# Patient Record
Sex: FEMALE | Race: WHITE | Employment: OTHER | URBAN - METROPOLITAN AREA
[De-identification: names, ages, dates, MRNs, and addresses within clinical notes are randomized per-mention and may not be internally consistent; named-entity substitution may affect disease eponyms.]

---

## 2019-05-16 ENCOUNTER — APPOINTMENT (OUTPATIENT)
Dept: MRI IMAGING | Age: 74
End: 2019-05-16
Attending: EMERGENCY MEDICINE
Payer: MEDICARE

## 2019-05-16 ENCOUNTER — HOSPITAL ENCOUNTER (EMERGENCY)
Age: 74
Discharge: HOME | End: 2019-05-16
Attending: EMERGENCY MEDICINE
Payer: MEDICARE

## 2019-05-16 ENCOUNTER — APPOINTMENT (OUTPATIENT)
Dept: CT IMAGING | Age: 74
End: 2019-05-16
Attending: EMERGENCY MEDICINE
Payer: MEDICARE

## 2019-05-16 VITALS
DIASTOLIC BLOOD PRESSURE: 40 MMHG | RESPIRATION RATE: 15 BRPM | WEIGHT: 186 LBS | SYSTOLIC BLOOD PRESSURE: 105 MMHG | HEART RATE: 60 BPM | TEMPERATURE: 97.4 F | OXYGEN SATURATION: 95 % | BODY MASS INDEX: 29.13 KG/M2

## 2019-05-16 DIAGNOSIS — R42 VERTIGO: Primary | ICD-10-CM

## 2019-05-16 DIAGNOSIS — R11.2 NAUSEA AND VOMITING, INTRACTABILITY OF VOMITING NOT SPECIFIED, UNSPECIFIED VOMITING TYPE: ICD-10-CM

## 2019-05-16 LAB
ANION GAP SERPL CALC-SCNC: 6 MMOL/L (ref 5–15)
ATRIAL RATE: 60 BPM
BASOPHILS # BLD: 0.1 K/UL (ref 0–0.1)
BASOPHILS NFR BLD: 1 % (ref 0–1)
BUN SERPL-MCNC: 12 MG/DL (ref 6–20)
BUN/CREAT SERPL: 17 (ref 12–20)
CALCIUM SERPL-MCNC: 8.5 MG/DL (ref 8.5–10.1)
CALCULATED P AXIS, ECG09: 35 DEGREES
CALCULATED R AXIS, ECG10: 61 DEGREES
CALCULATED T AXIS, ECG11: 88 DEGREES
CHLORIDE SERPL-SCNC: 111 MMOL/L (ref 97–108)
CO2 SERPL-SCNC: 26 MMOL/L (ref 21–32)
CREAT SERPL-MCNC: 0.69 MG/DL (ref 0.55–1.02)
DIAGNOSIS, 93000: NORMAL
DIFFERENTIAL METHOD BLD: ABNORMAL
EOSINOPHIL # BLD: 0.1 K/UL (ref 0–0.4)
EOSINOPHIL NFR BLD: 1 % (ref 0–7)
ERYTHROCYTE [DISTWIDTH] IN BLOOD BY AUTOMATED COUNT: 12.4 % (ref 11.5–14.5)
GLUCOSE BLD STRIP.AUTO-MCNC: 113 MG/DL (ref 65–100)
GLUCOSE BLD STRIP.AUTO-MCNC: 153 MG/DL (ref 65–100)
GLUCOSE SERPL-MCNC: 153 MG/DL (ref 65–100)
HCT VFR BLD AUTO: 36.6 % (ref 35–47)
HGB BLD-MCNC: 12 G/DL (ref 11.5–16)
IMM GRANULOCYTES # BLD AUTO: 0 K/UL (ref 0–0.04)
IMM GRANULOCYTES NFR BLD AUTO: 0 % (ref 0–0.5)
INR PPP: 1.1 (ref 0.9–1.1)
LYMPHOCYTES # BLD: 1.4 K/UL (ref 0.8–3.5)
LYMPHOCYTES NFR BLD: 16 % (ref 12–49)
MCH RBC QN AUTO: 29.3 PG (ref 26–34)
MCHC RBC AUTO-ENTMCNC: 32.8 G/DL (ref 30–36.5)
MCV RBC AUTO: 89.3 FL (ref 80–99)
MONOCYTES # BLD: 0.4 K/UL (ref 0–1)
MONOCYTES NFR BLD: 4 % (ref 5–13)
NEUTS SEG # BLD: 6.6 K/UL (ref 1.8–8)
NEUTS SEG NFR BLD: 78 % (ref 32–75)
NRBC # BLD: 0 K/UL (ref 0–0.01)
NRBC BLD-RTO: 0 PER 100 WBC
P-R INTERVAL, ECG05: 162 MS
PLATELET # BLD AUTO: 216 K/UL (ref 150–400)
PMV BLD AUTO: 9.6 FL (ref 8.9–12.9)
POTASSIUM SERPL-SCNC: 3.7 MMOL/L (ref 3.5–5.1)
PROTHROMBIN TIME: 11.1 SEC (ref 9–11.1)
Q-T INTERVAL, ECG07: 450 MS
QRS DURATION, ECG06: 84 MS
QTC CALCULATION (BEZET), ECG08: 450 MS
RBC # BLD AUTO: 4.1 M/UL (ref 3.8–5.2)
SERVICE CMNT-IMP: ABNORMAL
SERVICE CMNT-IMP: ABNORMAL
SODIUM SERPL-SCNC: 143 MMOL/L (ref 136–145)
TROPONIN I SERPL-MCNC: <0.05 NG/ML
VENTRICULAR RATE, ECG03: 60 BPM
WBC # BLD AUTO: 8.5 K/UL (ref 3.6–11)

## 2019-05-16 PROCEDURE — 85025 COMPLETE CBC W/AUTO DIFF WBC: CPT

## 2019-05-16 PROCEDURE — 82962 GLUCOSE BLOOD TEST: CPT

## 2019-05-16 PROCEDURE — 0042T CT PERF W CBF: CPT

## 2019-05-16 PROCEDURE — 96374 THER/PROPH/DIAG INJ IV PUSH: CPT

## 2019-05-16 PROCEDURE — 74011250637 HC RX REV CODE- 250/637: Performed by: EMERGENCY MEDICINE

## 2019-05-16 PROCEDURE — 74011636320 HC RX REV CODE- 636/320: Performed by: EMERGENCY MEDICINE

## 2019-05-16 PROCEDURE — 99285 EMERGENCY DEPT VISIT HI MDM: CPT

## 2019-05-16 PROCEDURE — 70551 MRI BRAIN STEM W/O DYE: CPT

## 2019-05-16 PROCEDURE — 70450 CT HEAD/BRAIN W/O DYE: CPT

## 2019-05-16 PROCEDURE — 74011250636 HC RX REV CODE- 250/636: Performed by: EMERGENCY MEDICINE

## 2019-05-16 PROCEDURE — 84484 ASSAY OF TROPONIN QUANT: CPT

## 2019-05-16 PROCEDURE — 70496 CT ANGIOGRAPHY HEAD: CPT

## 2019-05-16 PROCEDURE — 85610 PROTHROMBIN TIME: CPT

## 2019-05-16 PROCEDURE — 36415 COLL VENOUS BLD VENIPUNCTURE: CPT

## 2019-05-16 PROCEDURE — 80048 BASIC METABOLIC PNL TOTAL CA: CPT

## 2019-05-16 PROCEDURE — 93005 ELECTROCARDIOGRAM TRACING: CPT

## 2019-05-16 RX ORDER — MECLIZINE HYDROCHLORIDE 25 MG/1
25 TABLET ORAL
Qty: 20 TAB | Refills: 0 | Status: SHIPPED | OUTPATIENT
Start: 2019-05-16

## 2019-05-16 RX ORDER — CARBOXYMETHYLCELLULOSE SODIUM 10 MG/ML
1 GEL OPHTHALMIC
COMMUNITY

## 2019-05-16 RX ORDER — FISH OIL/DHA/EPA 1200-144MG
1200 CAPSULE ORAL 2 TIMES DAILY
COMMUNITY

## 2019-05-16 RX ORDER — METOPROLOL SUCCINATE 25 MG/1
50 TABLET, EXTENDED RELEASE ORAL DAILY
COMMUNITY

## 2019-05-16 RX ORDER — ONDANSETRON 4 MG/1
4 TABLET, ORALLY DISINTEGRATING ORAL
Qty: 10 TAB | Refills: 0 | Status: SHIPPED | OUTPATIENT
Start: 2019-05-16

## 2019-05-16 RX ORDER — SODIUM CHLORIDE 0.9 % (FLUSH) 0.9 %
10 SYRINGE (ML) INJECTION
Status: COMPLETED | OUTPATIENT
Start: 2019-05-16 | End: 2019-05-16

## 2019-05-16 RX ORDER — ONDANSETRON 2 MG/ML
4 INJECTION INTRAMUSCULAR; INTRAVENOUS
Status: COMPLETED | OUTPATIENT
Start: 2019-05-16 | End: 2019-05-16

## 2019-05-16 RX ORDER — ONDANSETRON 4 MG/1
4 TABLET, ORALLY DISINTEGRATING ORAL
Status: COMPLETED | OUTPATIENT
Start: 2019-05-16 | End: 2019-05-16

## 2019-05-16 RX ADMIN — ONDANSETRON 4 MG: 4 TABLET, ORALLY DISINTEGRATING ORAL at 15:31

## 2019-05-16 RX ADMIN — Medication 10 ML: at 09:37

## 2019-05-16 RX ADMIN — IOPAMIDOL 110 ML: 755 INJECTION, SOLUTION INTRAVENOUS at 09:36

## 2019-05-16 RX ADMIN — ONDANSETRON 4 MG: 2 INJECTION INTRAMUSCULAR; INTRAVENOUS at 10:18

## 2019-05-16 NOTE — DISCHARGE INSTRUCTIONS
Patient Education        Dizziness: Care Instructions  Your Care Instructions  Dizziness is the feeling of unsteadiness or fuzziness in your head. It is different than having vertigo, which is a feeling that the room is spinning or that you are moving or falling. It is also different from lightheadedness, which is the feeling that you are about to faint. It can be hard to know what causes dizziness. Some people feel dizzy when they have migraine headaches. Sometimes bouts of flu can make you feel dizzy. Some medical conditions, such as heart problems or high blood pressure, can make you feel dizzy. Many medicines can cause dizziness, including medicines for high blood pressure, pain, or anxiety. If a medicine causes your symptoms, your doctor may recommend that you stop or change the medicine. If it is a problem with your heart, you may need medicine to help your heart work better. If there is no clear reason for your symptoms, your doctor may suggest watching and waiting for a while to see if the dizziness goes away on its own. Follow-up care is a key part of your treatment and safety. Be sure to make and go to all appointments, and call your doctor if you are having problems. It's also a good idea to know your test results and keep a list of the medicines you take. How can you care for yourself at home? · If your doctor recommends or prescribes medicine, take it exactly as directed. Call your doctor if you think you are having a problem with your medicine. · Do not drive while you feel dizzy. · Try to prevent falls. Steps you can take include:  ? Using nonskid mats, adding grab bars near the tub, and using night-lights. ? Clearing your home so that walkways are free of anything you might trip on.  ? Letting family and friends know that you have been feeling dizzy. This will help them know how to help you. When should you call for help? Call 911 anytime you think you may need emergency care.  For example, call if:    · You passed out (lost consciousness).     · You have dizziness along with symptoms of a heart attack. These may include:  ? Chest pain or pressure, or a strange feeling in the chest.  ? Sweating. ? Shortness of breath. ? Nausea or vomiting. ? Pain, pressure, or a strange feeling in the back, neck, jaw, or upper belly or in one or both shoulders or arms. ? Lightheadedness or sudden weakness. ? A fast or irregular heartbeat.     · You have symptoms of a stroke. These may include:  ? Sudden numbness, tingling, weakness, or loss of movement in your face, arm, or leg, especially on only one side of your body. ? Sudden vision changes. ? Sudden trouble speaking. ? Sudden confusion or trouble understanding simple statements. ? Sudden problems with walking or balance. ? A sudden, severe headache that is different from past headaches.    Call your doctor now or seek immediate medical care if:    · You feel dizzy and have a fever, headache, or ringing in your ears.     · You have new or increased nausea and vomiting.     · Your dizziness does not go away or comes back.    Watch closely for changes in your health, and be sure to contact your doctor if:    · You do not get better as expected. Where can you learn more? Go to http://oziel-ling.info/. Enter I128 in the search box to learn more about \"Dizziness: Care Instructions. \"  Current as of: September 23, 2018  Content Version: 11.9  © 6129-0076 [employer]. Care instructions adapted under license by [employer] (which disclaims liability or warranty for this information). If you have questions about a medical condition or this instruction, always ask your healthcare professional. [place] [address] any warranty or liability for your use of this information.          Patient Education        Vertigo: Care Instructions  Your Care Instructions    Vertigo is the feeling that you or your surroundings are moving when there is no actual movement. It is often described as a feeling of spinning, whirling, falling, or tilting. Vertigo may make you vomit or feel nauseated. You may have trouble standing or walking and may lose your balance. Vertigo is often related to an inner ear problem, but it can have other more serious causes. If vertigo continues, you may need more tests to find its cause. Follow-up care is a key part of your treatment and safety. Be sure to make and go to all appointments, and call your doctor if you are having problems. It's also a good idea to know your test results and keep a list of the medicines you take. How can you care for yourself at home? · Do not lie flat on your back. Prop yourself up slightly. This may reduce the spinning feeling. Keep your eyes open. · Move slowly so that you do not fall. · If your doctor recommends medicine, take it exactly as directed. · Do not drive while you are having vertigo. Certain exercises, called Espinoza-Daroff exercises, can help decrease vertigo. To do Espinoza-Daroff exercises:  · Sit on the edge of a bed or sofa and quickly lie down on the side that causes the worst vertigo. Lie on your side with your ear down. · Stay in this position for at least 30 seconds or until the vertigo goes away. · Sit up. If this causes vertigo, wait for it to stop. · Repeat the procedure on the other side. · Repeat this 10 times. Do these exercises 2 times a day until the vertigo is gone. When should you call for help? Call 911 anytime you think you may need emergency care. For example, call if:    · You passed out (lost consciousness).     · You have symptoms of a stroke. These may include:  ? Sudden numbness, tingling, weakness, or loss of movement in your face, arm, or leg, especially on only one side of your body. ? Sudden vision changes. ? Sudden trouble speaking. ? Sudden confusion or trouble understanding simple statements.   ? Sudden problems with walking or balance. ? A sudden, severe headache that is different from past headaches.    Call your doctor now or seek immediate medical care if:    · Vertigo occurs with a fever, a headache, or ringing in your ears.     · You have new or increased nausea and vomiting.    Watch closely for changes in your health, and be sure to contact your doctor if:    · Vertigo gets worse or happens more often.     · Vertigo has not gotten better after 2 weeks. Where can you learn more? Go to http://oziel-ling.info/. Enter B803 in the search box to learn more about \"Vertigo: Care Instructions. \"  Current as of: March 27, 2018  Content Version: 11.9  © 8136-8567 [employer]. Care instructions adapted under license by [employer] (which disclaims liability or warranty for this information). If you have questions about a medical condition or this instruction, always ask your healthcare professional. [place] [address] any warranty or liability for your use of this information.

## 2019-05-16 NOTE — ED NOTES
Assumed care of pt at this time. Made contact with pt in bedside report. MRI screening completed and faxed at this time

## 2019-05-16 NOTE — ED NOTES
Pt tolerated ginger ale and crackers well without N/V. Pt ambulated at this time. Pt denies dizziness, gait steady however moves slightly to the right. Pt states she feels better and wants to go home. MD casey

## 2019-05-16 NOTE — ED NOTES
Pt reports her dizziness is improving. Skin warm and dry. Respirations even and unlabored. In no apparent distress at this time.

## 2019-05-16 NOTE — PROGRESS NOTES
Pharmacy Clarification of Prior to Admission Medication Regimen The patient was interviewed regarding clarification of the prior to admission medication regimen. Daughters were present in room and obtained permission from patient to discuss drug regimen with visitor(s) present. Patient was questioned regarding use of any other inhalers, topical products, over the counter medications, herbal medications, vitamin products or ophthalmic/nasal/otic medication use. Information Obtained From: Patient, prescription bottles, RX Query Pertinent Pharmacy Findings: 
Updated patient?s preferred outpatient pharmacy to: [address], [address] [facility] medication list was corrected to the following:  
 
Prior to Admission Medications Prescriptions Last Dose Informant Patient Reported? Taking? FLUoxetine (PROZAC) 40 mg capsule 5/15/2019 at Unknown time Other Yes Yes Sig: Take 40 mg by mouth daily. carboxymethylcellulose sodium (REFRESH LIQUIGEL) 1 % dlgl ophthalmic solution 5/9/2019 at Unknown time Self Yes Yes Sig: Administer 1 Drop to both eyes daily as needed (dry eyes). clopidogrel (PLAVIX) 75 mg tablet 5/15/2019 at Unknown time Other No Yes Sig: Take 1 tablet by mouth daily. fish oil-dha-epa 1,200-144-216 mg cap 5/15/2019 at Unknown time Other Yes Yes Sig: Take 1,200 mg by mouth two (2) times a day. metoprolol succinate (TOPROL-XL) 25 mg XL tablet 5/15/2019 at Unknown time Other Yes Yes Sig: Take 50 mg by mouth daily. simvastatin (ZOCOR) 40 mg tablet 5/15/2019 at Unknown time Other Yes Yes Sig: Take 40 mg by mouth nightly. Facility-Administered Medications: None Thank you, 
Cholo Webster CPhT Medication History Pharmacy Technician

## 2019-05-16 NOTE — ED NOTES
Attempted to walk pt at this time. Pt became very dizzy, diaphoretic, and nauseous. MD aware verbal orders for POC glucose, snack, and zofran.  at this time. MD aware

## 2019-05-16 NOTE — ED NOTES
MD acessed pt at this time. Does not feel that we need to flush ear at this time. MD verbal orders to walk pt

## 2019-05-16 NOTE — ED NOTES
Bedside and Verbal shift change report given to Mohit Zuniga RN (oncoming nurse) by this RN (offgoing nurse). Report included the following information SBAR.

## 2019-05-16 NOTE — ED NOTES
Per pt's daughters, Christine Herrera went to the ear doctor recently and they told her that her ear was clogged, but they didn't clean it out. \"

## 2019-05-16 NOTE — ROUTINE PROCESS
MRI NOTE 
 
MRI screening sheet needs to be completed and signed before the MRI can be performed. Please call [phone]  When this is done

## 2019-05-16 NOTE — ED NOTES
Elena Rosenbaum MD reviewed discharge instructions with the patient. The patient verbalized understanding. All questions and concerns were addressed. The patient was discharged via wheelchair in the care of family members with instructions and prescriptions in hand. Pt is alert and oriented x 4. Respirations are clear and unlabored.

## 2019-05-22 NOTE — ED PROVIDER NOTES
EMERGENCY DEPARTMENT HISTORY AND PHYSICAL EXAM      Date: 5/16/2019  Patient Name: Jenny Mcknight    History of Presenting Illness     Chief Complaint   Patient presents with    Dizziness     Presents to the ED via EMS with c/o waking up with dizziness and n/v. Reports history of vertigo and states she thinks this current episode is vertigo related.  Vomiting       History Provided By: Patient    HPI: Jenny Mcnkight, 68 y.o. female with PMHx as noted below presents the emergency department chief complaint of vertigo, nausea and vomiting. Patient states she woke up at approximately 730 this morning and went to stand up and felt as though the room was spinning. This was associated with nausea and several episodes of nonbloody emesis. Patient states her symptoms seem to be worse with movement and sudden head rotation and are somewhat relieved at rest.  Patient states she has had vertigo in the past but this feels more severe. Otherwise denies any tenderness, extremity weakness/numbness, chest pain, shortness of breath, headache, blurred/double vision, fevers, chills, dysuria      PCP: Danny Maria MD    Current Outpatient Medications   Medication Sig Dispense Refill    metoprolol succinate (TOPROL-XL) 25 mg XL tablet Take 50 mg by mouth daily.  fish oil-dha-epa 1,200-144-216 mg cap Take 1,200 mg by mouth two (2) times a day.  carboxymethylcellulose sodium (REFRESH LIQUIGEL) 1 % dlgl ophthalmic solution Administer 1 Drop to both eyes daily as needed (dry eyes).  meclizine (ANTIVERT) 25 mg tablet Take 1 Tab by mouth three (3) times daily as needed for Dizziness. 20 Tab 0    ondansetron (ZOFRAN ODT) 4 mg disintegrating tablet Take 1 Tab by mouth every eight (8) hours as needed for Nausea. 10 Tab 0    clopidogrel (PLAVIX) 75 mg tablet Take 1 tablet by mouth daily. 90 tablet 3    simvastatin (ZOCOR) 40 mg tablet Take 40 mg by mouth nightly.       FLUoxetine (PROZAC) 40 mg capsule Take 40 mg by mouth daily. Past History     Past Medical History:  Past Medical History:   Diagnosis Date    High cholesterol     Hypertension     Psychiatric disorder     depression       Past Surgical History:  Past Surgical History:   Procedure Laterality Date    CARDIAC SURG PROCEDURE UNLIST      stent    HX ORTHOPAEDIC      back       Family History:  Family History   Problem Relation Age of Onset    Stroke Mother        Social History:  Social History     Tobacco Use    Smoking status: Never Smoker   Substance Use Topics    Alcohol use: No    Drug use: No       Allergies: Allergies   Allergen Reactions    Prinzide [Lisinopril-Hydrochlorothiazide] Swelling         Review of Systems   Review of Systems  Constitutional: Negative for fever, chills, and fatigue. HENT: Negative for congestion, sore throat, rhinorrhea, sneezing and neck stiffness   Eyes: Negative for discharge and redness. Respiratory: Negative for  shortness of breath, wheezing   Cardiovascular: Negative for chest pain, palpitations   Gastrointestinal: Negative for nausea, vomiting, abdominal pain, constipation, diarrhea and blood in stool. Genitourinary: Negative for dysuria, hematuria, flank pain, decreased urine volume, discharge,   Musculoskeletal: Negative for myalgias or joint pain . Skin: Negative for rash or lesions . Neurological: Negative weakness,numbness and headaches. Positive vertigo      Physical Exam   Physical Exam    GENERAL: alert and oriented, no acute distress  EYES: PEERL, No injection, discharge or icterus. ENT: Mucous membranes pink and moist.  NECK: Supple  LUNGS: Airway patent. Non-labored respirations. Breath sounds clear with good air entry bilaterally. HEART: Regular rate and rhythm. No peripheral edema  ABDOMEN: Non-distended and non-tender, without guarding or rebound.   SKIN:  warm, dry  MSK/EXTREMITIES: Without swelling, tenderness or deformity, symmetric with normal ROM  NEUROLOGICAL:  alert and oriented x 3, CN II-XII grossly intact, strength 5/5 bilateral upper and lower extremities, sensation intact throughout to light touch, no dysmetria. Diagnostic Study Results     Labs -   No results found for this or any previous visit (from the past 12 hour(s)). Radiologic Studies -   MRI BRAIN WO CONT   Final Result   IMPRESSION:   No acute process. CTA CODE NEURO HEAD AND NECK W CONT   Final Result   IMPRESSION:   1. No evidence of large vessel occlusion, hemodynamically significant stenosis,   or intracranial aneurysm. 2.  Normal CT perfusion. CT PERF W CBF   Final Result   IMPRESSION:   1. No evidence of large vessel occlusion, hemodynamically significant stenosis,   or intracranial aneurysm. 2.  Normal CT perfusion. CT CODE NEURO HEAD WO CONTRAST   Final Result   IMPRESSION: No acute process or change compared to the prior exam.              CT Results  (Last 48 hours)    None        CXR Results  (Last 48 hours)    None            Medical Decision Making   I am the first provider for this patient. I reviewed the vital signs, available nursing notes, past medical history, past surgical history, family history and social history. Vital Signs-Reviewed the patient's vital signs. EKG interpretation: (Preliminary)  Rhythm: normal sinus rhythm; and regular . Rate (approx.): 60; Axis: normal; P wave: normal; QRS interval: normal ; ST/T wave: normal;  was interpreted by Fortunato Mills MD,ED Provider. Records Reviewed: Nursing Notes and Old Medical Records    Provider Notes (Medical Decision Making): On presentation, the patient is well appearing, in no acute distress with normal vital signs. Based on my history and exam the differential diagnosis for this patient includes stroke, BPPV, Ménière's disease, labyrinthitis. Consult with tele-neurology felt this was likely peripheral cause of the vertigo.   CT CT angiogram were obtained on arrival which showed no acute pathology. MRI also showed no acute pathology. Patient's symptoms were treated and on reevaluation was feeling much better. She was able to ambulate unassisted prior to discharge. ED Course:   Initial assessment performed. The patients presenting problems have been discussed, and they are in agreement with the care plan formulated and outlined with them. I have encouraged them to ask questions as they arise throughout their visit. PROGRESS NOTE:  The patient has been re-evaluated and is ready for discharge. Reviewed available results with patient and have counseled them on diagnosis and care plan. They have expressed understanding, and all their questions have been answered. They agree with plan and agree to have pt F/U as recommended, or return to the ED if their sxs worsen. Discharge instructions have been provided and explained to them, along with reasons to have pt return to the ED. The patient is amenable to discharge so will discharge pt at this time    John Armendariz MD        Disposition:  home    PLAN:  1. Discharge Medication List as of 5/16/2019  2:56 PM      START taking these medications    Details   meclizine (ANTIVERT) 25 mg tablet Take 1 Tab by mouth three (3) times daily as needed for Dizziness., Normal, Disp-20 Tab, R-0         CONTINUE these medications which have NOT CHANGED    Details   metoprolol succinate (TOPROL-XL) 25 mg XL tablet Take 50 mg by mouth daily. , Historical Med      fish oil-dha-epa 1,200-144-216 mg cap Take 1,200 mg by mouth two (2) times a day., Historical Med      carboxymethylcellulose sodium (REFRESH LIQUIGEL) 1 % dlgl ophthalmic solution Administer 1 Drop to both eyes daily as needed (dry eyes). , Historical Med      clopidogrel (PLAVIX) 75 mg tablet Take 1 tablet by mouth daily. , Normal, Disp-90 tablet, R-3      simvastatin (ZOCOR) 40 mg tablet Take 40 mg by mouth nightly., Historical Med      FLUoxetine (PROZAC) 40 mg capsule Take 40 mg by mouth daily., Historical Med         STOP taking these medications       metoprolol (LOPRESSOR) 25 mg tablet Comments:   Reason for Stoppin.   Follow-up Information     Follow up With Specialties Details Why Contact Info    Bess Otoole MD Family Practice Schedule an appointment as soon as possible for a visit in 1 day  [address] [place] [address] [phone]          Return to ED if worse     Diagnosis     Clinical Impression:   1. Vertigo    2.  Nausea and vomiting, intractability of vomiting not specified, unspecified vomiting type

## 2025-06-05 ENCOUNTER — HOSPITAL ENCOUNTER (OUTPATIENT)
Facility: HOSPITAL | Age: 80
Discharge: HOME | End: 2025-06-08
Payer: MEDICARE

## 2025-06-05 ENCOUNTER — TRANSCRIBE ORDERS (OUTPATIENT)
Facility: HOSPITAL | Age: 80
End: 2025-06-05

## 2025-06-05 DIAGNOSIS — M25.511 RIGHT SHOULDER PAIN, UNSPECIFIED CHRONICITY: ICD-10-CM

## 2025-06-05 DIAGNOSIS — M25.512 LEFT SHOULDER PAIN, UNSPECIFIED CHRONICITY: Primary | ICD-10-CM

## 2025-06-05 DIAGNOSIS — M25.512 LEFT SHOULDER PAIN, UNSPECIFIED CHRONICITY: ICD-10-CM

## 2025-06-05 PROCEDURE — 73030 X-RAY EXAM OF SHOULDER: CPT

## 2025-07-17 ENCOUNTER — TRANSCRIBE ORDERS (OUTPATIENT)
Facility: HOSPITAL | Age: 80
End: 2025-07-17

## 2025-07-17 ENCOUNTER — HOSPITAL ENCOUNTER (OUTPATIENT)
Facility: HOSPITAL | Age: 80
Discharge: HOME | End: 2025-07-20
Payer: MEDICARE

## 2025-07-17 DIAGNOSIS — M25.562 PAIN IN BOTH KNEES, UNSPECIFIED CHRONICITY: ICD-10-CM

## 2025-07-17 DIAGNOSIS — M25.561 PAIN IN BOTH KNEES, UNSPECIFIED CHRONICITY: ICD-10-CM

## 2025-07-17 DIAGNOSIS — M79.644 PAIN IN FINGER OF RIGHT HAND: Primary | ICD-10-CM

## 2025-07-17 DIAGNOSIS — M79.644 PAIN IN FINGER OF RIGHT HAND: ICD-10-CM

## 2025-07-17 PROCEDURE — 73130 X-RAY EXAM OF HAND: CPT

## 2025-07-17 PROCEDURE — 73562 X-RAY EXAM OF KNEE 3: CPT
